# Patient Record
Sex: MALE | Race: WHITE | NOT HISPANIC OR LATINO | Employment: FULL TIME | URBAN - METROPOLITAN AREA
[De-identification: names, ages, dates, MRNs, and addresses within clinical notes are randomized per-mention and may not be internally consistent; named-entity substitution may affect disease eponyms.]

---
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
## 2021-12-07 ENCOUNTER — HOSPITAL ENCOUNTER (EMERGENCY)
Facility: CLINIC | Age: 21
Discharge: HOME | End: 2021-12-08
Attending: EMERGENCY MEDICINE | Admitting: EMERGENCY MEDICINE
Payer: COMMERCIAL

## 2021-12-07 DIAGNOSIS — J10.1 INFLUENZA A: ICD-10-CM

## 2021-12-07 LAB
FLUAV RNA SPEC QL NAA+PROBE: POSITIVE
FLUBV RNA RESP QL NAA+PROBE: NEGATIVE
SARS-COV-2 RNA RESP QL NAA+PROBE: NEGATIVE

## 2021-12-07 PROCEDURE — 87636 SARSCOV2 & INF A&B AMP PRB: CPT | Performed by: EMERGENCY MEDICINE

## 2021-12-07 PROCEDURE — C9803 HOPD COVID-19 SPEC COLLECT: HCPCS

## 2021-12-07 PROCEDURE — 99283 EMERGENCY DEPT VISIT LOW MDM: CPT

## 2021-12-07 ASSESSMENT — MIFFLIN-ST. JEOR: SCORE: 2397.45

## 2021-12-07 NOTE — Clinical Note
Sal Ye was seen and treated in our emergency department on 12/7/2021.  He may return to work on 12/14/2021.       If you have any questions or concerns, please don't hesitate to call.      Gisela Orellana MD

## 2021-12-08 VITALS
BODY MASS INDEX: 35.68 KG/M2 | SYSTOLIC BLOOD PRESSURE: 133 MMHG | WEIGHT: 287 LBS | OXYGEN SATURATION: 97 % | TEMPERATURE: 98 F | HEIGHT: 75 IN | HEART RATE: 99 BPM | DIASTOLIC BLOOD PRESSURE: 73 MMHG | RESPIRATION RATE: 16 BRPM

## 2021-12-08 PROCEDURE — 250N000013 HC RX MED GY IP 250 OP 250 PS 637: Performed by: EMERGENCY MEDICINE

## 2021-12-08 RX ORDER — IBUPROFEN 600 MG/1
600 TABLET, FILM COATED ORAL EVERY 6 HOURS PRN
Qty: 30 TABLET | Refills: 0 | Status: ON HOLD | OUTPATIENT
Start: 2021-12-08 | End: 2022-02-25

## 2021-12-08 RX ORDER — FLUTICASONE PROPIONATE 50 MCG
1 SPRAY, SUSPENSION (ML) NASAL 2 TIMES DAILY
Qty: 1 ML | Refills: 0 | Status: ON HOLD | OUTPATIENT
Start: 2021-12-08 | End: 2022-02-24

## 2021-12-08 RX ORDER — IBUPROFEN 600 MG/1
600 TABLET, FILM COATED ORAL ONCE
Status: COMPLETED | OUTPATIENT
Start: 2021-12-08 | End: 2021-12-08

## 2021-12-08 RX ADMIN — IBUPROFEN 600 MG: 600 TABLET ORAL at 00:28

## 2021-12-08 NOTE — ED PROVIDER NOTES
"  History     Chief Complaint:  Flu Symptoms      The history is provided by the patient.      Sal Ye is a 21 year old male with history of asthma who presents with 2 days of congestion, rhinorrhea, fatigue, weakness, myalgias, headache, nausea, shortness of breath. He denies vomiting and reports that he has been able to keep food and fluids down as he is not vomiting. He has been taking 600 mg of ibuprofen every 3-4 hours which has not been helping. He states that he was seen at [facility] yesterday and was swabbed for COVID-19, which was negative, and sent home. He mentions that he did not have a positive experience there. Sal reports that he has had his flu vaccine but not his COVID-19 vaccine; he notes that he had a COVID-19 infection in February 2021.    Review of Systems  10 systems reviewed and negative except as above and in HPI.     Allergies:  The patient has no known allergies.     Medications:  Albuterol  Elavil  Imitrex  Buspar  Wellbutrin  Hydroxyzine  Meloxicam    Past Medical History:    Lumbar DDD  ADD   Obesity  Migraine  Asthma   Cellulitis  GI bleed    Past Surgical History:    Ear tube placement  Toe surgery  Tonsillectomy    Family History:    Father: migraines    Social History:  Patient presents to the ED alone.  Patient presents to the ED via car.    Physical Exam     Patient Vitals for the past 24 hrs:   BP Temp Temp src Pulse Resp SpO2 Height Weight   12/08/21 0015 133/73 -- -- -- -- 97 % -- --   12/08/21 0010 133/73 -- -- 99 -- 98 % -- --   12/08/21 0000 -- -- -- -- -- 96 % -- --   12/07/21 2345 -- -- -- -- -- 98 % -- --   12/07/21 2248 (!) 129/5 98  F (36.7  C) Temporal 99 16 98 % 1.905 m (6' 3\") 130.2 kg (287 lb)     Physical Exam  General: Alert, No distress. Nontoxic appearance  Head: No signs of trauma.   Mouth/Throat: Oropharynx moist.   Eyes: Conjunctivae are normal. Pupils are equal..   Neck: Normal range of motion.    CV: Appears well perfused.  Resp:No respiratory " distress.   MSK: Normal range of motion. No obvious deformity.   Neuro: The patient is alert and interactive. KRISHNA. Speech normal. GCS 15  Skin: No lesion or sign of trauma noted.   Psych: normal mood and affect. behavior is normal.      Emergency Department Course     Laboratory:  Labs Ordered and Resulted from Time of ED Arrival to Time of ED Departure   INFLUENZA A/B & SARS-COV2 PCR MULTIPLEX - Abnormal       Result Value    Influenza A PCR Positive (*)     Influenza B PCR Negative      SARS CoV2 PCR Negative       Emergency Department Course:    Reviewed:  I reviewed nursing notes, vitals, past medical history, care everywhere, and [facility]    Assessments:  2350 I obtained history and examined the patient as noted above. I explained laboratory findings.     Interventions:  0028 Ibuprofen 600 mg PO    Disposition:  The patient was discharged to home.     Impression & Plan     Medical Decision Making:  Sal Ye is a 21 year old male who presents for evaluation of cough and fever. They also have myalgias. This is consistent with influenza. Patient understands the sensitivity of influenza rapid test.  They are at risk for pneumonia but no signs of this are detected on today's visit.  Close followup of primary care physician is indicated and return to the ED for high fevers > 103 for more than 48 hours more, increasing productive cough, shortness of breath, or confusion.  There is no signs of serious bacterial infection such as bacteremia, meningitis, UTI/pyelonephritis, strep pharyngitis, etc.      Diagnosis:    ICD-10-CM    1. Influenza A  J10.1        Discharge Medications:  New Prescriptions    FLUTICASONE (FLONASE) 50 MCG/ACT NASAL SPRAY    Spray 1 spray into both nostrils 2 times daily    IBUPROFEN (ADVIL/MOTRIN) 600 MG TABLET    Take 1 tablet (600 mg) by mouth every 6 hours as needed for moderate pain       Scribe Disclosure:  Ping GOMEZ, am serving as a scribe at 11:34 PM on 12/7/2021 to document  services personally performed by Gisela Orellana MD based on my observations and the provider's statements to me.       Gisela Orellana MD  12/08/21 2102

## 2021-12-11 ENCOUNTER — HOSPITAL ENCOUNTER (EMERGENCY)
Facility: CLINIC | Age: 21
Discharge: HOME | End: 2021-12-12
Admitting: EMERGENCY MEDICINE
Payer: COMMERCIAL

## 2021-12-11 PROCEDURE — 999N000104 HC STATISTIC NO CHARGE

## 2021-12-12 VITALS
HEART RATE: 117 BPM | OXYGEN SATURATION: 97 % | DIASTOLIC BLOOD PRESSURE: 76 MMHG | TEMPERATURE: 98.3 F | RESPIRATION RATE: 24 BRPM | SYSTOLIC BLOOD PRESSURE: 144 MMHG

## 2021-12-12 LAB
ALBUMIN SERPL-MCNC: 3.9 G/DL (ref 3.4–5)
ALP SERPL-CCNC: 60 U/L (ref 40–150)
ALT SERPL W P-5'-P-CCNC: 76 U/L (ref 0–70)
ANION GAP SERPL CALCULATED.3IONS-SCNC: 8 MMOL/L (ref 3–14)
AST SERPL W P-5'-P-CCNC: 56 U/L (ref 0–45)
BASOPHILS # BLD AUTO: 0 10E3/UL (ref 0–0.2)
BASOPHILS NFR BLD AUTO: 0 %
BILIRUB SERPL-MCNC: 0.2 MG/DL (ref 0.2–1.3)
BUN SERPL-MCNC: 10 MG/DL (ref 7–30)
CALCIUM SERPL-MCNC: 8.7 MG/DL (ref 8.5–10.1)
CHLORIDE BLD-SCNC: 110 MMOL/L (ref 94–109)
CO2 SERPL-SCNC: 23 MMOL/L (ref 20–32)
CREAT SERPL-MCNC: 0.69 MG/DL (ref 0.66–1.25)
EOSINOPHIL # BLD AUTO: 0 10E3/UL (ref 0–0.7)
EOSINOPHIL NFR BLD AUTO: 0 %
ERYTHROCYTE [DISTWIDTH] IN BLOOD BY AUTOMATED COUNT: 11.9 % (ref 10–15)
ETHANOL SERPL-MCNC: 0.13 G/DL
GFR SERPL CREATININE-BSD FRML MDRD: >90 ML/MIN/1.73M2
GLUCOSE BLD-MCNC: 114 MG/DL (ref 70–99)
HCT VFR BLD AUTO: 42.8 % (ref 40–53)
HGB BLD-MCNC: 14.8 G/DL (ref 13.3–17.7)
IMM GRANULOCYTES # BLD: 0.1 10E3/UL
IMM GRANULOCYTES NFR BLD: 1 %
LYMPHOCYTES # BLD AUTO: 1.3 10E3/UL (ref 0.8–5.3)
LYMPHOCYTES NFR BLD AUTO: 14 %
MCH RBC QN AUTO: 30.2 PG (ref 26.5–33)
MCHC RBC AUTO-ENTMCNC: 34.6 G/DL (ref 31.5–36.5)
MCV RBC AUTO: 87 FL (ref 78–100)
MONOCYTES # BLD AUTO: 0.5 10E3/UL (ref 0–1.3)
MONOCYTES NFR BLD AUTO: 5 %
NEUTROPHILS # BLD AUTO: 7.7 10E3/UL (ref 1.6–8.3)
NEUTROPHILS NFR BLD AUTO: 80 %
NRBC # BLD AUTO: 0 10E3/UL
NRBC BLD AUTO-RTO: 0 /100
PLATELET # BLD AUTO: 254 10E3/UL (ref 150–450)
POTASSIUM BLD-SCNC: 3.1 MMOL/L (ref 3.4–5.3)
PROT SERPL-MCNC: 7.4 G/DL (ref 6.8–8.8)
RBC # BLD AUTO: 4.9 10E6/UL (ref 4.4–5.9)
SODIUM SERPL-SCNC: 141 MMOL/L (ref 133–144)
WBC # BLD AUTO: 9.6 10E3/UL (ref 4–11)

## 2021-12-12 PROCEDURE — 36415 COLL VENOUS BLD VENIPUNCTURE: CPT | Performed by: EMERGENCY MEDICINE

## 2021-12-12 PROCEDURE — 80053 COMPREHEN METABOLIC PANEL: CPT | Performed by: EMERGENCY MEDICINE

## 2021-12-12 PROCEDURE — 82077 ASSAY SPEC XCP UR&BREATH IA: CPT | Performed by: EMERGENCY MEDICINE

## 2021-12-12 PROCEDURE — 250N000011 HC RX IP 250 OP 636: Performed by: EMERGENCY MEDICINE

## 2021-12-12 PROCEDURE — 85025 COMPLETE CBC W/AUTO DIFF WBC: CPT | Performed by: EMERGENCY MEDICINE

## 2021-12-12 RX ORDER — ONDANSETRON 2 MG/ML
4 INJECTION INTRAMUSCULAR; INTRAVENOUS ONCE
Status: COMPLETED | OUTPATIENT
Start: 2021-12-12 | End: 2021-12-12

## 2021-12-12 RX ADMIN — ONDANSETRON 4 MG: 2 INJECTION INTRAMUSCULAR; INTRAVENOUS at 00:15

## 2021-12-12 NOTE — ED TRIAGE NOTES
"[facility]  ED Arrival Note    Arrives through triage ambulatory to a wheelchair stating that he is too intoxicated to walk and feels like he is going to \"black out. Pt states that he has been vomiting and cant keep anything down. Recently dx with Influenza.     Visitors during triage: Partner    Triage Interventions: N/A    Ambulatory: Yes    Meets Stroke Criteria?: No    Meets Trauma Criteria?: No    Shock Index: N/A, for provider reference    Directed to: Main ED    "

## 2022-02-23 ENCOUNTER — HOSPITAL ENCOUNTER (OUTPATIENT)
Facility: CLINIC | Age: 22
Setting detail: OBSERVATION
Discharge: HOME | End: 2022-02-25
Attending: EMERGENCY MEDICINE | Admitting: INTERNAL MEDICINE
Payer: COMMERCIAL

## 2022-02-23 DIAGNOSIS — L03.113 RIGHT ARM CELLULITIS: ICD-10-CM

## 2022-02-23 LAB
ANION GAP SERPL CALCULATED.3IONS-SCNC: 4 MMOL/L (ref 3–14)
BASOPHILS # BLD AUTO: 0 10E3/UL (ref 0–0.2)
BASOPHILS NFR BLD AUTO: 1 %
BUN SERPL-MCNC: 13 MG/DL (ref 7–30)
CALCIUM SERPL-MCNC: 8.8 MG/DL (ref 8.5–10.1)
CHLORIDE BLD-SCNC: 108 MMOL/L (ref 94–109)
CO2 SERPL-SCNC: 26 MMOL/L (ref 20–32)
CREAT SERPL-MCNC: 1.04 MG/DL (ref 0.66–1.25)
CRP SERPL-MCNC: <2.9 MG/L (ref 0–8)
EOSINOPHIL # BLD AUTO: 0.1 10E3/UL (ref 0–0.7)
EOSINOPHIL NFR BLD AUTO: 1 %
ERYTHROCYTE [DISTWIDTH] IN BLOOD BY AUTOMATED COUNT: 12.2 % (ref 10–15)
GFR SERPL CREATININE-BSD FRML MDRD: >90 ML/MIN/1.73M2
GLUCOSE BLD-MCNC: 96 MG/DL (ref 70–99)
HCT VFR BLD AUTO: 43.1 % (ref 40–53)
HGB BLD-MCNC: 14.9 G/DL (ref 13.3–17.7)
IMM GRANULOCYTES # BLD: 0 10E3/UL
IMM GRANULOCYTES NFR BLD: 0 %
LACTATE SERPL-SCNC: 0.7 MMOL/L (ref 0.7–2)
LYMPHOCYTES # BLD AUTO: 1.9 10E3/UL (ref 0.8–5.3)
LYMPHOCYTES NFR BLD AUTO: 25 %
MCH RBC QN AUTO: 30.4 PG (ref 26.5–33)
MCHC RBC AUTO-ENTMCNC: 34.6 G/DL (ref 31.5–36.5)
MCV RBC AUTO: 88 FL (ref 78–100)
MONOCYTES # BLD AUTO: 0.8 10E3/UL (ref 0–1.3)
MONOCYTES NFR BLD AUTO: 11 %
NEUTROPHILS # BLD AUTO: 4.9 10E3/UL (ref 1.6–8.3)
NEUTROPHILS NFR BLD AUTO: 62 %
NRBC # BLD AUTO: 0 10E3/UL
NRBC BLD AUTO-RTO: 0 /100
PLATELET # BLD AUTO: 261 10E3/UL (ref 150–450)
POTASSIUM BLD-SCNC: 4 MMOL/L (ref 3.4–5.3)
PROCALCITONIN SERPL-MCNC: <0.05 NG/ML
RBC # BLD AUTO: 4.9 10E6/UL (ref 4.4–5.9)
SARS-COV-2 RNA RESP QL NAA+PROBE: NEGATIVE
SODIUM SERPL-SCNC: 138 MMOL/L (ref 133–144)
WBC # BLD AUTO: 7.8 10E3/UL (ref 4–11)

## 2022-02-23 PROCEDURE — 87040 BLOOD CULTURE FOR BACTERIA: CPT | Performed by: EMERGENCY MEDICINE

## 2022-02-23 PROCEDURE — C9803 HOPD COVID-19 SPEC COLLECT: HCPCS

## 2022-02-23 PROCEDURE — 250N000011 HC RX IP 250 OP 636: Performed by: EMERGENCY MEDICINE

## 2022-02-23 PROCEDURE — 86140 C-REACTIVE PROTEIN: CPT | Performed by: EMERGENCY MEDICINE

## 2022-02-23 PROCEDURE — 85025 COMPLETE CBC W/AUTO DIFF WBC: CPT | Performed by: EMERGENCY MEDICINE

## 2022-02-23 PROCEDURE — 83605 ASSAY OF LACTIC ACID: CPT | Performed by: EMERGENCY MEDICINE

## 2022-02-23 PROCEDURE — 87635 SARS-COV-2 COVID-19 AMP PRB: CPT | Performed by: EMERGENCY MEDICINE

## 2022-02-23 PROCEDURE — 84145 PROCALCITONIN (PCT): CPT | Performed by: EMERGENCY MEDICINE

## 2022-02-23 PROCEDURE — 36415 COLL VENOUS BLD VENIPUNCTURE: CPT | Performed by: EMERGENCY MEDICINE

## 2022-02-23 PROCEDURE — 99285 EMERGENCY DEPT VISIT HI MDM: CPT | Mod: 25

## 2022-02-23 PROCEDURE — 96365 THER/PROPH/DIAG IV INF INIT: CPT

## 2022-02-23 PROCEDURE — 96375 TX/PRO/DX INJ NEW DRUG ADDON: CPT

## 2022-02-23 PROCEDURE — 80048 BASIC METABOLIC PNL TOTAL CA: CPT | Performed by: EMERGENCY MEDICINE

## 2022-02-23 RX ORDER — VANCOMYCIN HYDROCHLORIDE 1 G/200ML
1000 INJECTION, SOLUTION INTRAVENOUS ONCE
Status: DISCONTINUED | OUTPATIENT
Start: 2022-02-23 | End: 2022-02-23

## 2022-02-23 RX ADMIN — CEFEPIME HYDROCHLORIDE 2 G: 2 INJECTION, POWDER, FOR SOLUTION INTRAVENOUS at 23:34

## 2022-02-23 RX ADMIN — HYDROMORPHONE HYDROCHLORIDE 1 MG: 1 INJECTION, SOLUTION INTRAMUSCULAR; INTRAVENOUS; SUBCUTANEOUS at 23:21

## 2022-02-24 LAB
HOLD SPECIMEN: NORMAL

## 2022-02-24 PROCEDURE — 96375 TX/PRO/DX INJ NEW DRUG ADDON: CPT

## 2022-02-24 PROCEDURE — 250N000011 HC RX IP 250 OP 636: Performed by: HOSPITALIST

## 2022-02-24 PROCEDURE — 258N000003 HC RX IP 258 OP 636: Performed by: EMERGENCY MEDICINE

## 2022-02-24 PROCEDURE — 99219 PR INITIAL OBSERVATION CARE,LEVEL II: CPT | Performed by: HOSPITALIST

## 2022-02-24 PROCEDURE — 96367 TX/PROPH/DG ADDL SEQ IV INF: CPT

## 2022-02-24 PROCEDURE — 96376 TX/PRO/DX INJ SAME DRUG ADON: CPT

## 2022-02-24 PROCEDURE — 250N000013 HC RX MED GY IP 250 OP 250 PS 637: Performed by: HOSPITALIST

## 2022-02-24 PROCEDURE — 250N000011 HC RX IP 250 OP 636: Performed by: EMERGENCY MEDICINE

## 2022-02-24 PROCEDURE — 258N000003 HC RX IP 258 OP 636: Performed by: HOSPITALIST

## 2022-02-24 PROCEDURE — 99207 PR CDG-CODE CATEGORY CHANGED: CPT | Performed by: HOSPITALIST

## 2022-02-24 PROCEDURE — 250N000011 HC RX IP 250 OP 636: Performed by: INTERNAL MEDICINE

## 2022-02-24 PROCEDURE — G0378 HOSPITAL OBSERVATION PER HR: HCPCS

## 2022-02-24 RX ORDER — HYDROMORPHONE HCL IN WATER/PF 6 MG/30 ML
0.2 PATIENT CONTROLLED ANALGESIA SYRINGE INTRAVENOUS
Status: DISCONTINUED | OUTPATIENT
Start: 2022-02-24 | End: 2022-02-24

## 2022-02-24 RX ORDER — LIDOCAINE 40 MG/G
CREAM TOPICAL
Status: DISCONTINUED | OUTPATIENT
Start: 2022-02-24 | End: 2022-02-25 | Stop reason: HOSPADM

## 2022-02-24 RX ORDER — OXYCODONE HYDROCHLORIDE 5 MG/1
5 TABLET ORAL EVERY 4 HOURS PRN
Status: DISCONTINUED | OUTPATIENT
Start: 2022-02-24 | End: 2022-02-25 | Stop reason: HOSPADM

## 2022-02-24 RX ORDER — NALOXONE HYDROCHLORIDE 0.4 MG/ML
0.2 INJECTION, SOLUTION INTRAMUSCULAR; INTRAVENOUS; SUBCUTANEOUS
Status: DISCONTINUED | OUTPATIENT
Start: 2022-02-24 | End: 2022-02-25 | Stop reason: HOSPADM

## 2022-02-24 RX ORDER — HYDROMORPHONE HYDROCHLORIDE 1 MG/ML
0.5 INJECTION, SOLUTION INTRAMUSCULAR; INTRAVENOUS; SUBCUTANEOUS
Status: DISCONTINUED | OUTPATIENT
Start: 2022-02-24 | End: 2022-02-25 | Stop reason: HOSPADM

## 2022-02-24 RX ORDER — CEFAZOLIN SODIUM 2 G/100ML
2 INJECTION, SOLUTION INTRAVENOUS EVERY 8 HOURS
Status: DISCONTINUED | OUTPATIENT
Start: 2022-02-24 | End: 2022-02-25 | Stop reason: HOSPADM

## 2022-02-24 RX ORDER — NALOXONE HYDROCHLORIDE 0.4 MG/ML
0.4 INJECTION, SOLUTION INTRAMUSCULAR; INTRAVENOUS; SUBCUTANEOUS
Status: DISCONTINUED | OUTPATIENT
Start: 2022-02-24 | End: 2022-02-25 | Stop reason: HOSPADM

## 2022-02-24 RX ADMIN — HYDROMORPHONE HYDROCHLORIDE 0.5 MG: 1 INJECTION, SOLUTION INTRAMUSCULAR; INTRAVENOUS; SUBCUTANEOUS at 08:04

## 2022-02-24 RX ADMIN — HYDROMORPHONE HYDROCHLORIDE 0.5 MG: 1 INJECTION, SOLUTION INTRAMUSCULAR; INTRAVENOUS; SUBCUTANEOUS at 02:29

## 2022-02-24 RX ADMIN — OXYCODONE HYDROCHLORIDE 5 MG: 5 TABLET ORAL at 09:31

## 2022-02-24 RX ADMIN — VANCOMYCIN HYDROCHLORIDE 2500 MG: 5 INJECTION, POWDER, LYOPHILIZED, FOR SOLUTION INTRAVENOUS at 00:16

## 2022-02-24 RX ADMIN — CEFAZOLIN SODIUM 2 G: 2 INJECTION, SOLUTION INTRAVENOUS at 19:58

## 2022-02-24 RX ADMIN — OXYCODONE HYDROCHLORIDE 5 MG: 5 TABLET ORAL at 22:08

## 2022-02-24 RX ADMIN — VANCOMYCIN HYDROCHLORIDE 1500 MG: 5 INJECTION, POWDER, LYOPHILIZED, FOR SOLUTION INTRAVENOUS at 12:49

## 2022-02-24 RX ADMIN — CEFEPIME HYDROCHLORIDE 2 G: 2 INJECTION, POWDER, FOR SOLUTION INTRAVENOUS at 12:06

## 2022-02-24 RX ADMIN — HYDROMORPHONE HYDROCHLORIDE 0.2 MG: 0.2 INJECTION, SOLUTION INTRAMUSCULAR; INTRAVENOUS; SUBCUTANEOUS at 00:43

## 2022-02-24 RX ADMIN — OXYCODONE HYDROCHLORIDE 5 MG: 5 TABLET ORAL at 03:31

## 2022-02-24 RX ADMIN — HYDROMORPHONE HYDROCHLORIDE 0.5 MG: 1 INJECTION, SOLUTION INTRAMUSCULAR; INTRAVENOUS; SUBCUTANEOUS at 20:30

## 2022-02-24 ASSESSMENT — ENCOUNTER SYMPTOMS
SHORTNESS OF BREATH: 0
FEVER: 0
SORE THROAT: 0

## 2022-02-24 ASSESSMENT — ACTIVITIES OF DAILY LIVING (ADL)
TOILETING: 0-->INDEPENDENT
ADLS_ACUITY_SCORE: 7
NUMBER_OF_TIMES_PATIENT_HAS_FALLEN_WITHIN_LAST_SIX_MONTHS: 1
DIFFICULTY_EATING/SWALLOWING: NO
HEARING_DIFFICULTY_OR_DEAF: NO
ADLS_ACUITY_SCORE: 7
ADLS_ACUITY_SCORE: 12
WALKING_OR_CLIMBING_STAIRS_DIFFICULTY: NO
TRANSFERRING: 0-->INDEPENDENT
ADLS_ACUITY_SCORE: 7
ADLS_ACUITY_SCORE: 7
WALKING_OR_CLIMBING_STAIRS: OTHER (SEE COMMENTS)
ADLS_ACUITY_SCORE: 7
ADLS_ACUITY_SCORE: 7
FALL_HISTORY_WITHIN_LAST_SIX_MONTHS: YES
DOING_ERRANDS_INDEPENDENTLY_DIFFICULTY: YES
ADLS_ACUITY_SCORE: 7
ADLS_ACUITY_SCORE: 7
TOILETING_ISSUES: YES
BATHING: 0-->INDEPENDENT
ADLS_ACUITY_SCORE: 7
VISION_MANAGEMENT: GLASSES
ADLS_ACUITY_SCORE: 7
DRESS: 0-->INDEPENDENT
ADLS_ACUITY_SCORE: 7
DRESSING/BATHING_DIFFICULTY: YES
TRANSFERRING: 0-->INDEPENDENT
DIFFICULTY_COMMUNICATING: NO
ADLS_ACUITY_SCORE: 7
DRESS: 0-->INDEPENDENT
TOILETING: 0-->INDEPENDENT
ADLS_ACUITY_SCORE: 12
WEAR_GLASSES_OR_BLIND: YES
CONCENTRATING,_REMEMBERING_OR_MAKING_DECISIONS_DIFFICULTY: NO
ADLS_ACUITY_SCORE: 7

## 2022-02-24 NOTE — ED NOTES
[facility]  ED Nurse Handoff Report    ED Chief complaint: Wound check     ED Diagnosis:   Final diagnoses:   Right arm cellulitis       Code Status: To be addressed by admitting provider.     Allergies:   Allergies   Allergen Reactions     No Known Drug Allergies        Patient Story: Patient arrived to ED for evaluation of erythema to right lower forearm. Patient states he has a history of cellulitis with surgical intervention. Patient states he had a red bump that looked like a pimple, which he popped with a razor blade at home. Reddened area has spread outside outlined area. Patient was treated with antibiotics at another facility but states he was unable to  his prescription due to unknown reasons. Denies IV drug use or bumping/scraping the arm. Afebrile in ED.    Focused Assessment:    Cardiac - WDL  Respiratory - WDL  Neuro - WDL  Skin - Scabbed/open area outlined on R. Forearm. Redness and erythema has spread further up arm.  Labs Ordered and Resulted from Time of ED Arrival to Time of ED Departure   BASIC METABOLIC PANEL - Normal       Result Value    Sodium 138      Potassium 4.0      Chloride 108      Carbon Dioxide (CO2) 26      Anion Gap 4      Urea Nitrogen 13      Creatinine 1.04      Calcium 8.8      Glucose 96      GFR Estimate >90     LACTIC ACID WHOLE BLOOD - Normal    Lactic Acid 0.7     CRP INFLAMMATION - Normal    CRP Inflammation <2.9     PROCALCITONIN - Normal    Procalcitonin <0.05     COVID-19 VIRUS (CORONAVIRUS) BY PCR - Normal    SARS CoV2 PCR Negative     CBC WITH PLATELETS AND DIFFERENTIAL    WBC Count 7.8      RBC Count 4.90      Hemoglobin 14.9      Hematocrit 43.1      MCV 88      MCH 30.4      MCHC 34.6      RDW 12.2      Platelet Count 261      % Neutrophils 62      % Lymphocytes 25      % Monocytes 11      % Eosinophils 1      % Basophils 1      % Immature Granulocytes 0      NRBCs per 100 WBC 0      Absolute Neutrophils 4.9      Absolute Lymphocytes 1.9       Absolute Monocytes 0.8      Absolute Eosinophils 0.1      Absolute Basophils 0.0      Absolute Immature Granulocytes 0.0      Absolute NRBCs 0.0     BLOOD CULTURE   BLOOD CULTURE       Treatments and/or interventions provided: Labs. IV antibiotics. Pain medication.     Patient's response to treatments and/or interventions: Patient tolerating ED interventions appropriately.    To be done/followed up on inpatient unit:  See orders.     Does this patient have any cognitive concerns?: None    Activity level - Baseline/Home:  Independent  Activity Level - Current:   Independent    Patient's Preferred language: English   Needed?: No    Isolation: None  Infection: Not Applicable  Patient tested for COVID 19 prior to admission: YES  Bariatric?: No    Vital Signs:   Vitals:    02/23/22 2237   BP: 104/48   Pulse: 89   Temp: 98.1  F (36.7  C)   TempSrc: Oral   SpO2: 98%       Cardiac Rhythm:     Was the PSS-3 completed:   Yes  What interventions are required if any?               Family Comments: N/A  OBS brochure/video discussed/provided to patient/family: N/A              Name of person given brochure if not patient: N/A              Relationship to patient: N/A    For the majority of the shift this patient's behavior was Green.   Behavioral interventions performed were Patient calm and cooperative in ED.    ED NURSE PHONE NUMBER: [phone]

## 2022-02-24 NOTE — PROGRESS NOTES
/64   Pulse 89   Temp 97.8  F (36.6  C) (Oral)   Resp 16   SpO2 98%     AxOx4, independent in the room, painful and numb right forearm CMS otherwise intact, pulses strong. Patient complaining of 10/10 pain, IV dilaudid for severe pain and oxy to maintain analgesia, will continue to monitor. Patient calm, cooperative, and pleasant no other concerns.

## 2022-02-24 NOTE — PROGRESS NOTES
[facility]    Received request for benefit check should Sal require home IV abx. Sal has 100% coverage for IV abx through [facility] plan. He ay have a copay per dispense on the drug through pharmacy plan.    Thank you    Maryjane Reyes RN  [facility] Liaison  [phone] (Mon thru Fri 8am - 5pm)  [phone] Office

## 2022-02-24 NOTE — PHARMACY-VANCOMYCIN DOSING SERVICE
Pharmacy Vancomycin Initial Note  Date of Service 2022  Patient's  2000  22 year old, male    Indication: Skin and Soft Tissue Infection    Current estimated CrCl = Estimated Creatinine Clearance: 163.4 mL/min (based on SCr of 1.04 mg/dL).    Creatinine for last 3 days  2022: 10:47 PM Creatinine 1.04 mg/dL    Recent Vancomycin Level(s) for last 3 days  No results found for requested labs within last 72 hours.      Vancomycin IV Administrations (past 72 hours)                   vancomycin 2500 mg in 0.9% NaCl 500 ml intermittent infusion 2,500 mg (mg) 2,500 mg New Bag 22 0016                Nephrotoxins and other renal medications (From now, onward)    Start     Dose/Rate Route Frequency Ordered Stop    22 1200  vancomycin 1500 mg in 0.9% NaCl 250 ml intermittent infusion 1,500 mg         1,500 mg  over 90 Minutes Intravenous EVERY 12 HOURS 22 0155      22 2325  vancomycin 2500 mg in 0.9% NaCl 500 ml intermittent infusion 2,500 mg         2,500 mg  over 120 Minutes Intravenous ONCE 22 2321            Contrast Orders - past 72 hours (72h ago, onward)            None          Insight[employer] Prediction of Planned Initial Vancomycin Regimen  Loading dose: 2500 mg at 00:16 2022.  Regimen: 1500 mg IV every 12 hours.  Start time: 01:54 on 2022  Exposure target: AUC24 (range)400-600 mg/L.hr   AUC24,ss: 549 mg/L.hr  Probability of AUC24 > 400: 81 %  Ctrough,ss: 17.9 mg/L  Probability of Ctrough,ss > 20: 41 %  Probability of nephrotoxicity (Lodise STEVEN ): 14 %          Plan:  1. Start vancomycin  1500 mg IV q12h.   2. Vancomycin monitoring method: AUC  3. Vancomycin therapeutic monitoring goal: 400-600 mg*h/L  4. Pharmacy will check vancomycin levels as appropriate in 1-3 Days.    5. Serum creatinine levels will be ordered daily for the first week of therapy and at least twice weekly for subsequent weeks.      Martell Hoover [facility]

## 2022-02-24 NOTE — PHARMACY-ADMISSION MEDICATION HISTORY
Pharmacy Medication History  Admission medication history interview status for the 2/23/2022  admission is complete. See EPIC admission navigator for prior to admission medications     Location of Interview: Phone  Medication history sources: Patient, Surescripts and Care Everywhere    Significant changes made to the medication list:  Removed: albuterol, amitriptyline, amoxicillin, Flonase, Liquifilm tears, sumatriptan      Additional medication history information:   Patient confirmed not taking any prescription or OTC meds other than the ibuprofen PRN.    Medication reconciliation completed by provider prior to medication history? No    Time spent in this activity: 15 mins    Prior to Admission medications    Medication Sig Last Dose Taking? Auth Provider   ibuprofen (ADVIL/MOTRIN) 600 MG tablet Take 1 tablet (600 mg) by mouth every 6 hours as needed for moderate pain  at PRN Yes Gisela Orellana MD       The information provided in this note is only as accurate as the sources available at the time of update(s)     Rachel Munoz PharmD

## 2022-02-24 NOTE — H&P
[facility]    History and Physical - Hospitalist Service       Date of Admission:  2/23/2022    Assessment & Plan      Sal Ye is a 22 year old male admitted on 2/23/2022. He is admitted to the hospital for cellulitis which is worsening.    1) cellulitis of the right forearm  -Patient has symptoms present for the past 3 to 4 days and they have been getting worse and he was seen at ED at [facility] and his WBC count was normal and he had imaging of the CT scan of the forearm on the right and right humerus was done which showed  the ct humerus and ct forearm  Skin thickening and subcutaneous edema involving the volar soft tissues of the forearm and wrist, be consistent with the clinical history of cellulitis. These are all located within the subcutaneous adipose layer, and are confined to the superficial 5 mm underneath the skin.  No evidence of fasciitis or myositis.  No soft tissue gas to specifically indicate a necrotizing infection. However, clinical correlation and/or follow-up is recommended, as early necrotizing infections cannot be entirely excluded by imaging.  Normal muscles, tendons, bones, and joints. Normal vessels  -He does have significant amount amount of pain and on my examination there is changes consistent with cellulitis with redness, tenderness and he does have sensation and has good range of motion around his wrist  -Blood cultures have been done at [facility] on 2/23/2022 and will need to be followed  -His WBC count and CRP has been normal  -Patient was started on vancomycin and cefepime in the ED and we will continue the same  -We will continue to monitor him clinically very closely and if his local changes worsen then we can consult orthopedics  -We will continue the patient with pain control    2) asthma  -On exam he does not have any wheezing and his home medications can be started when verified by the pharmacy           Diet:  Regular  DVT  Prophylaxis: Ambulate every shift  Nuñez Catheter: Not present  Central Lines: None  Cardiac Monitoring: None  Code Status:  Full code    Clinically Significant Risk Factors Present on Admission                     Disposition Plan   Expected Discharge:    Anticipated discharge location:  Awaiting care coordination huddle  Delays:           The patient's care was discussed with the Bedside Nurse, Patient and ED physician .    Santa Dorsey MD  Hospitalist Service  [facility]  Securely message with the Vocera Web Console (learn more here)  Text page via SnapNames Paging/Directory       Addendum 719 am   I saw him around 7 am again and he has good pulses and swelling is less and less numbness and he is able to make a fist.Fells better  ______________________________________________________________________    Chief Complaint     Redness of the right arm and getting worse    History is obtained from the patient, talking with ER doc and review of care everywhere     History of Present Illness   Sal Ye is a 22 year old male who presented to the ED with a chief complaint that he has noticed redness which started on his forearm and has extending upward with increased pain.    Patient does have history of cellulitis in the past and was treated with I&D and antibiotics at [facility] few years back and he currently works as a  and also is a  and he mentioned that 3 to 4 days back he noticed that on the right forearm near the wrist he noticed area of circular swelling bigger than a dime and there was some redness and some local swelling and it has increased and he tried to raegan it with a razor knife and small amount of pussy material came out and he went to the outside doctor/er  on 2/22 and was given antibiotics in the ED and was prescribed outpatient antibiotics but he has not been able to pick the same and then noticed today that he has increased pain, swelling  and redness is spreading towards the elbow and he has noticed some more pain towards his humerus.  He also mentioned that the tip of his right thumb is also more painful.  He does not recall any local trauma but given his occupation he does endorse it might have had happened.  He denies any fever, chills, nausea, vomiting but does have increased redness, pain and some tingling over the humerus but his range of motion around the shoulder joint and his movement over the fingers is intact.  Patient is also able to squeeze his wrist.  He denies any chest pain, shortness of breath, nausea, vomiting, abdominal pain.    He did go to outside ER at and he had work-up done including CT scan of the humerus and right forearm which showed no evidence of fasciitis or myositis, skin thickening and subcutaneous edema involving the volar soft tissues of the forearm and wrist, be consistent with the clinical history of cellulitis. These are all located within the subcutaneous adipose layer, and are confined to the superficial 5 mm underneath the skin.  No soft tissue gas to specifically indicate a necrotizing infection. However, clinical correlation and/or follow-up is recommended, as early necrotizing infections cannot be entirely excluded by imaging.  Normal muscles, tendons, bones, and joints. Normal vessels    His labs showed that his CK was mildly elevated at 235, basic metabolic panel was normal, WBC count was normal, CRP was normal at 0.09 and his LFTs were normal apart from ALT which was 46.  He was prescribed broad-spectrum antibiotics but he did not finish antibiotics and took his IV and eloped and came to the ED at [place].  He did have blood cultures done at outside hospital      Review of Systems    The 10 point Review of Systems is negative other than noted in the HPI     Past Medical History    I have reviewed this patient's medical history and updated it with pertinent information if needed.   No past medical  history on file.    Past Surgical History   I have reviewed this patient's surgical history and updated it with pertinent information if needed.  Past Surgical History:   Procedure Laterality Date     ORTHOPEDIC SURGERY       TONSILLECTOMY Bilateral 12/17/2015    Procedure: TONSILLECTOMY;  Surgeon: Estefania Mendoza MD;  Location:  OR       Social History   I have reviewed this patient's social history and updated it with pertinent information if needed.  Social History     Tobacco Use     Smoking status: Current Every Day Smoker     Types: Vaping Device     Smokeless tobacco: Never Used   Substance Use Topics     Alcohol use: Yes     Drug use: No       Family History     No significant family history, including no history of: DM or HTN    Noncontributory to his current HOSPITAL STAY    Prior to Admission Medications   Prior to Admission Medications   Prescriptions Last Dose Informant Patient Reported? Taking?   SUMAtriptan (IMITREX) 25 MG tablet   No No   Sig: Take 1 tablet (25 mg) by mouth at onset of headache for migraine   albuterol (PROAIR HFA, PROVENTIL HFA, VENTOLIN HFA) 108 (90 BASE) MCG/ACT inhaler   Yes No   Sig: Inhale 1-2 puffs into the lungs   amitriptyline (ELAVIL) 50 MG tablet   No No   Sig: Take 1 tablet (50 mg) by mouth At Bedtime   amoxicillin (AMOXIL) 500 MG capsule   No No   Sig: Take 1 capsule (500 mg) by mouth 3 times daily   fluticasone (FLONASE) 50 MCG/ACT nasal spray   No No   Sig: Spray 1 spray into both nostrils 2 times daily   ibuprofen (ADVIL/MOTRIN) 600 MG tablet   No No   Sig: Take 1 tablet (600 mg) by mouth every 6 hours as needed for moderate pain   polyvinyl alcohol (LIQUIFILM TEARS) 1.4 % ophthalmic solution   No No   Sig: Place 1 drop Into the left eye every 2 hours      Facility-Administered Medications: None     Allergies   Allergies   Allergen Reactions     No Known Drug Allergies        Physical Exam   Vital Signs: Temp: 98.1  F (36.7  C) Temp src: Oral BP: 114/71 Pulse: 98    Resp: 16 SpO2: 98 % O2 Device: None (Room air)    Weight: 0 lbs 0 oz        General: Patient appears comfortable and in no acute distress.  HEENT: Head is atraumatic, normocephalic.  Pupils are equal, round and reactive to light.  No scleral icterus.   Neck: Neck is supple .Thyroid is not palpable.  Lymphatic: No Local LN mayo  Respiratory: Lungs are clear to auscultation bilaterally. No wheezes, rhonchi bilaterally.  Cardiovascular: Regular rate and rhythm.  Normal S1 and S2.  No murmurs, rubs, or gallops.  Radial and dorsalis pedis and  are 2+ bilaterally.  No jugular venous distention present.  No pretibial edema noted.  Abdomen:   Normal to visual inspection. Soft, No guarding, rigidity.  Normoactive bowel sounds.  Non-tender to palpation.  No masses are appreciated.  Skin: changes consistent with cellulitis with redness , local tenderness and swelling over right arm and tip of the thumb is mildly painful  Neurologic: Higher functions are within normal limits. No obvious defects in speech, language and memory.   Musculoskeletal:   There is full range of motion in the upper and lower extremities bilaterally.  Strength is 5/5 in all the major muscle groups in the upper and lower extremities bilaterally.    Psychiatric: Affect is not blunted and mood is appropriate. Thinking and behavior are appropriate.      Data   Data reviewed today: I reviewed all medications, new labs and imaging results over the last 24 hours. I personally reviewed the ct humerus and ct forearm  image(s) showing 1.  Skin thickening and subcutaneous edema involving the volar soft tissues of the forearm and wrist, be consistent with the clinical history of cellulitis. These are all located within the subcutaneous adipose layer, and are confined to the superficial 5 mm underneath the skin. .     2 .No evidence of fasciitis or myositis.     3.  No soft tissue gas to specifically indicate a necrotizing infection. However, clinical correlation  and/or follow-up is recommended, as early necrotizing infections cannot be entirely excluded by imaging.     4.  Normal muscles, tendons, bones, and joints. Normal vessels.      Recent Labs   Lab 02/23/22 2247   WBC 7.8   HGB 14.9   MCV 88         POTASSIUM 4.0   CHLORIDE 108   CO2 26   BUN 13   CR 1.04   ANIONGAP 4   STEPAN 8.8   GLC 96     Most Recent 3 CBC's:Recent Labs   Lab Test 02/23/22 2247 12/12/21  0012 11/04/20  0030   WBC 7.8 9.6 8.3   HGB 14.9 14.8 15.7   MCV 88 87 87    254 288     Most Recent 3 BMP's:Recent Labs   Lab Test 02/23/22 2247 12/12/21  0012 11/04/20  0030    141 138   POTASSIUM 4.0 3.1* 3.9   CHLORIDE 108 110* 106   CO2 26 23 28   BUN 13 10 14   CR 1.04 0.69 1.02*   ANIONGAP 4 8 4   STEPAN 8.8 8.7 8.5   GLC 96 114* 93     No results found for this or any previous visit (from the past 24 hour(s)).

## 2022-02-24 NOTE — ED PROVIDER NOTES
History     Chief Complaint:  Wound Check (Erythema to right upper arm over the last day or so. Unsure if fever. )       SONAL Ye is a 22 year old male with a history of cellulitis who presents to this emergency room for cellulitis.  The patient noticed a small pimple to the volar aspect of his right wrist approximately 5 days ago.  At that point he took a razor blade to try and raegan this with very minimal output.  He was seen at the emergency department last night for cellulitis and was placed on antibiotics.  He had not been able to feel that prescription today.  He then returned to that seen emergency department this evening where they did blood work that showed a normal CBC, an elevated CK, and ALT of 48, a normal lactic acid and metabolic panel.  Blood cultures are pending.  They also did a CT of his forearm which showed subcutaneous findings consistent with cellulitis.  They did outline his arm and take pictures both last night and tonight.  They wrote for antibiotics and this was being administered when he pulled his IV out and left AMA.  The patient states that he felt disrespected and they were being rude to him there which is why he left there and came to this hospital.  Additionally, the patient states that his been more painful over the past 5 days.  He is also felt that there has been streaking up his arm.  He is not sure whether he has had fever, denies any sore throat, cough, vomiting, diarrhea.  He states that 2 years ago he had cellulitis of his right arm.  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    ROS:  Review of Systems   Constitutional: Negative for fever.   HENT: Negative for sore throat.    Respiratory: Negative for shortness of breath.    Cardiovascular: Negative for chest pain.   All other systems reviewed and are negative.        Allergies:  No Known Drug Allergies     Medications:    albuterol (PROAIR HFA, PROVENTIL HFA,  VENTOLIN HFA) 108 (90 BASE) MCG/ACT inhaler  amitriptyline (ELAVIL) 50 MG tablet  amoxicillin (AMOXIL) 500 MG capsule  fluticasone (FLONASE) 50 MCG/ACT nasal spray  ibuprofen (ADVIL/MOTRIN) 600 MG tablet  polyvinyl alcohol (LIQUIFILM TEARS) 1.4 % ophthalmic solution  SUMAtriptan (IMITREX) 25 MG tablet        Past Medical History:    No past medical history on file.  Patient Active Problem List   Diagnosis     Right arm cellulitis        Past Surgical History:    Past Surgical History:   Procedure Laterality Date     ORTHOPEDIC SURGERY       TONSILLECTOMY Bilateral 12/17/2015    Procedure: TONSILLECTOMY;  Surgeon: Estefania Mendoza MD;  Location: [facility] OR        Family History:    family history is not on file.    Social History:   reports that he has been smoking vaping device. He has never used smokeless tobacco. He reports current alcohol use. He reports that he does not use drugs.  PCP: Clinic, [facility]     Physical Exam     Patient Vitals for the past 24 hrs:   BP Temp Temp src Pulse SpO2   02/23/22 2237 104/48 98.1  F (36.7  C) Oral 89 98 %        Physical Exam    Physical Exam   Constitutional:  Patient is oriented to person, place, and time. They appear well-developed and well-nourished. Mild distress secondary to arm pain   HENT:   Mouth/Throat:   Oropharynx is clear and moist.   Eyes:    Conjunctivae normal and EOM are normal. Pupils are equal, round, and reactive to light.   Neck:    Normal range of motion.   Cardiovascular: Normal rate, regular rhythm and normal heart sounds.  Exam reveals no gallop and no friction rub.  No murmur heard.  Pulmonary/Chest:  Effort normal and breath sounds normal. Patient has no wheezes. Patient has no rales.   Abdominal:   Soft. Bowel sounds are normal. Patient exhibits no mass. There is no tenderness. There is no rebound and no guarding.   Musculoskeletal:  Normal range of motion. Patient exhibits no edema.   Neurological:   Patient is alert and oriented to  person, place, and time. Patient has normal strength. No cranial nerve deficit or sensory deficit. GCS 15  Skin:   Patient has a scab on his right wrist with surrounding erythema.  There is no obvious fluctuance.  It is tender to touch.  He also has what appears to be rubbing petechiae going up his arm.  He also has some mild streaking.   Psychiatric:   Patient has a normal mood and affect. Patient's behavior is normal. Judgment and thought content normal.         Emergency Department Course       Laboratory:  Labs Ordered and Resulted from Time of ED Arrival to Time of ED Departure   BASIC METABOLIC PANEL - Normal       Result Value    Sodium 138      Potassium 4.0      Chloride 108      Carbon Dioxide (CO2) 26      Anion Gap 4      Urea Nitrogen 13      Creatinine 1.04      Calcium 8.8      Glucose 96      GFR Estimate >90     LACTIC ACID WHOLE BLOOD - Normal    Lactic Acid 0.7     CRP INFLAMMATION - Normal    CRP Inflammation <2.9     PROCALCITONIN - Normal    Procalcitonin <0.05     COVID-19 VIRUS (CORONAVIRUS) BY PCR - Normal    SARS CoV2 PCR Negative     CBC WITH PLATELETS AND DIFFERENTIAL    WBC Count 7.8      RBC Count 4.90      Hemoglobin 14.9      Hematocrit 43.1      MCV 88      MCH 30.4      MCHC 34.6      RDW 12.2      Platelet Count 261      % Neutrophils 62      % Lymphocytes 25      % Monocytes 11      % Eosinophils 1      % Basophils 1      % Immature Granulocytes 0      NRBCs per 100 WBC 0      Absolute Neutrophils 4.9      Absolute Lymphocytes 1.9      Absolute Monocytes 0.8      Absolute Eosinophils 0.1      Absolute Basophils 0.0      Absolute Immature Granulocytes 0.0      Absolute NRBCs 0.0     BLOOD CULTURE   BLOOD CULTURE        Procedures   None    Emergency Department Course:             Reviewed:  I reviewed nursing notes, vitals and past medical history    Assessments:   I obtained history and examined the patient as noted above.    I rechecked the patient and explained findings.        Consults:   Dr. Dorsey of hospitalist service    Interventions:  Medications   vancomycin 2500 mg in 0.9% NaCl 500 ml intermittent infusion 2,500 mg (has no administration in time range)   ceFEPIme (MAXIPIME) 2 g vial to attach to  ml bag for ADULTS or 50 ml bag for PEDS (0 g Intravenous Stopped 2/24/22 0001)   HYDROmorphone (DILAUDID) injection 1 mg (1 mg Intravenous Given 2/23/22 1131)        Disposition:  The patient was admitted to the hospital under the care of Dr. Dorsey.     Impression & Plan    CMS Diagnoses: None      Covid-19  Sal Ye was evaluated during a global COVID-19 pandemic, which necessitated consideration that the patient might be at risk for infection with the SARS-CoV-2 virus that causes COVID-19.   Applicable protocols for evaluation were followed during the patient's care.   COVID-19 was considered as part of the patient's evaluation. The plan for testing is:  a test was obtained during this visit.    Medical Decision Making:  Sal Rodriguez is a 22-year-old male presenting to this emergency department after leaving another emergency department while being treated for cellulitis.  Patient is examination is consistent with cellulitis with some lymphangitic streaking.  I do not appreciate any abscess.  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  I did review the blood work performed proximally 3 hours ago at an outside hospital as well as the imaging and pictures.  We did recheck some of his blood work here and fortunately his white count Remains normal as is his CRP and procalcitonin.  I did give him broad-spectrum antibiotics here and at this point we will admit him to hospital for cellulitis.  I did not appreciate any increased streaking or redness from his earlier visit this evening.  Certainly considered a fast spreading infection such as necrotizing fasciitis but thus far his exam is not worrisome for this.  Additionally I do  not see any evidence of abscess that needs to be drained.  We will bring him in for IV antibiotics admit to Dr. Dorsey..    Diagnosis:    ICD-10-CM    1. Right arm cellulitis  L03.113         2/24/2022   Stefani Salinas MD Bochert, Michelle Ann, MD  02/24/22 0017

## 2022-02-24 NOTE — PROGRESS NOTES
RECEIVING UNIT ED HANDOFF REVIEW    ED Nurse Handoff Report was reviewed by: Arthur Hoskins RN on February 24, 2022 at 1:07 AM

## 2022-02-24 NOTE — PROGRESS NOTES
"MD Notification    Notified Person: MD     Notified Person Name: Santa Dorsey    Notification Date/Time: 2/24/22    Notification Interaction:     Paged. \"Pain not managed with 0.2 dilaudid, patient requesting additional pain meds, reporting 10/10 pain. Order stronger dose of dilaudid or oxycodone?\"    Purpose of Notification: pain prn's    Orders Received: 8445    Comments:      "

## 2022-02-24 NOTE — UTILIZATION REVIEW
"Admission Status; Secondary Review Determination     Under the authority of the Utilization Management Committee, the utilization review process indicated a secondary review on the above patient.  The review outcome is based on review of the medical records, discussions with staff, and applying clinical experience noted on the date of the review.       (x) Observation Status Appropriate - This patient does not meet hospital inpatient criteria and is placed in observation status. If this patient's primary payer is Medicare and was admitted as an inpatient, Condition Code 44 should be used and patient status changed to \"observation\".     RATIONALE FOR DETERMINATION: 22-year-old male who developed a wound on the volar aspect of this right wrist with resultant clinical signs of cellulitis extending up his forearm.  Patient has been afebrile, normal white blood count, normal procalcitonin and CRP level.  No signs of abscess.  Observation care appropriate for initial course of IV antibiotics as well as pain control.  If patient has progression of cellulitis on 2/25/2022, then at that time patient would be appropriate to advance to inpatient care.       The severity of illness, intensity of service provided, expected LOS and risk for adverse outcome make the care appropriate for further observation; however, doesn't meet criteria for hospital inpatient admission. This was discussed with attending physician who concurred with this determination.    The information on this document is developed by the utilization review team in order for the business office to ensure compliance.  This only denotes the appropriateness of proper admission status and does not reflect the quality of care rendered.         The definitions of Inpatient Status and Observation Status used in making the determination above are those provided in the CMS Coverage Manual, Chapter 1 and Chapter 6, section 70.4.      Sincerely,     Andry Quintero MD  "   Physician Advisor  Utilization Review/ Case Management  [facility].

## 2022-02-24 NOTE — PROGRESS NOTES
MD paged. Pt is requesting to be discharge today stated '' I have to go back to work and I am no longer in pain''  Rates pain 0/10.  Please advice

## 2022-02-25 VITALS
DIASTOLIC BLOOD PRESSURE: 57 MMHG | RESPIRATION RATE: 16 BRPM | HEART RATE: 76 BPM | SYSTOLIC BLOOD PRESSURE: 116 MMHG | OXYGEN SATURATION: 97 % | TEMPERATURE: 98.4 F

## 2022-02-25 LAB
ANION GAP SERPL CALCULATED.3IONS-SCNC: 2 MMOL/L (ref 3–14)
BASOPHILS # BLD AUTO: 0 10E3/UL (ref 0–0.2)
BASOPHILS NFR BLD AUTO: 1 %
BUN SERPL-MCNC: 9 MG/DL (ref 7–30)
CALCIUM SERPL-MCNC: 8.3 MG/DL (ref 8.5–10.1)
CHLORIDE BLD-SCNC: 111 MMOL/L (ref 94–109)
CO2 SERPL-SCNC: 25 MMOL/L (ref 20–32)
CREAT SERPL-MCNC: 0.92 MG/DL (ref 0.66–1.25)
EOSINOPHIL # BLD AUTO: 0.1 10E3/UL (ref 0–0.7)
EOSINOPHIL NFR BLD AUTO: 2 %
ERYTHROCYTE [DISTWIDTH] IN BLOOD BY AUTOMATED COUNT: 12.3 % (ref 10–15)
GFR SERPL CREATININE-BSD FRML MDRD: >90 ML/MIN/1.73M2
GLUCOSE BLD-MCNC: 154 MG/DL (ref 70–99)
HCT VFR BLD AUTO: 42.1 % (ref 40–53)
HGB BLD-MCNC: 14 G/DL (ref 13.3–17.7)
IMM GRANULOCYTES # BLD: 0 10E3/UL
IMM GRANULOCYTES NFR BLD: 0 %
LYMPHOCYTES # BLD AUTO: 1.6 10E3/UL (ref 0.8–5.3)
LYMPHOCYTES NFR BLD AUTO: 37 %
MCH RBC QN AUTO: 29.9 PG (ref 26.5–33)
MCHC RBC AUTO-ENTMCNC: 33.3 G/DL (ref 31.5–36.5)
MCV RBC AUTO: 90 FL (ref 78–100)
MONOCYTES # BLD AUTO: 0.5 10E3/UL (ref 0–1.3)
MONOCYTES NFR BLD AUTO: 11 %
NEUTROPHILS # BLD AUTO: 2.2 10E3/UL (ref 1.6–8.3)
NEUTROPHILS NFR BLD AUTO: 49 %
NRBC # BLD AUTO: 0 10E3/UL
NRBC BLD AUTO-RTO: 0 /100
PLATELET # BLD AUTO: 230 10E3/UL (ref 150–450)
POTASSIUM BLD-SCNC: 3.8 MMOL/L (ref 3.4–5.3)
RBC # BLD AUTO: 4.69 10E6/UL (ref 4.4–5.9)
SODIUM SERPL-SCNC: 138 MMOL/L (ref 133–144)
WBC # BLD AUTO: 4.4 10E3/UL (ref 4–11)

## 2022-02-25 PROCEDURE — 250N000013 HC RX MED GY IP 250 OP 250 PS 637: Performed by: HOSPITALIST

## 2022-02-25 PROCEDURE — G0378 HOSPITAL OBSERVATION PER HR: HCPCS

## 2022-02-25 PROCEDURE — 80048 BASIC METABOLIC PNL TOTAL CA: CPT | Performed by: HOSPITALIST

## 2022-02-25 PROCEDURE — 250N000011 HC RX IP 250 OP 636: Performed by: INTERNAL MEDICINE

## 2022-02-25 PROCEDURE — 99217 PR OBSERVATION CARE DISCHARGE: CPT | Performed by: INTERNAL MEDICINE

## 2022-02-25 PROCEDURE — 36415 COLL VENOUS BLD VENIPUNCTURE: CPT | Performed by: HOSPITALIST

## 2022-02-25 PROCEDURE — 96376 TX/PRO/DX INJ SAME DRUG ADON: CPT

## 2022-02-25 PROCEDURE — 85025 COMPLETE CBC W/AUTO DIFF WBC: CPT | Performed by: HOSPITALIST

## 2022-02-25 RX ORDER — CEPHALEXIN 500 MG/1
1000 CAPSULE ORAL 4 TIMES DAILY
Qty: 56 CAPSULE | Refills: 0 | Status: SHIPPED | OUTPATIENT
Start: 2022-02-25 | End: 2022-03-04

## 2022-02-25 RX ADMIN — CEFAZOLIN SODIUM 2 G: 2 INJECTION, SOLUTION INTRAVENOUS at 04:07

## 2022-02-25 RX ADMIN — OXYCODONE HYDROCHLORIDE 5 MG: 5 TABLET ORAL at 04:11

## 2022-02-25 NOTE — PROGRESS NOTES
"Patient adequate for discharge. Anxious to leave and requesting doctor's note for work prior to discharge. Paged Dr. Rivera at this time, but no response back.     ADDENDUM: Patient \"in a rush\" so left floor without note   "

## 2022-02-25 NOTE — DISCHARGE SUMMARY
[facility]  Discharge Summary        Sal Ye MRN# [id number]   YOB: 2000 Age: 22 year old     Date of Admission:  2/23/2022  Date of Discharge:  2/25/2022  Admitting Physician:  Arun Rivera MD  Discharge Physician: Arun Rivera MD  Discharging Service: Hospitalist     Primary Provider:  Clinic, [facility]  Primary Care Physician Phone Number: [phone]         Discharge Diagnoses/Problem Oriented Hospital Course (Providers):    Sal Ye was admitted on 2/23/2022 by Arun Rivera MD and I would refer you to their history and physical.  The following problems were addressed during his hospitalization:    Sal Ye is a 22 year old male admitted on 2/23/2022. He is admitted to the hospital for cellulitis which is worsening.     1) cellulitis of the right forearm with lymphangitis  -Patient has symptoms present for the past 3 to 4 days and they have been getting worse and he was seen at ED at [facility] and his WBC count was normal and he had imaging of the CT scan of the forearm on the right and right humerus was done which showed  the ct humerus and ct forearm  Skin thickening and subcutaneous edema involving the volar soft tissues of the forearm and wrist, be consistent with the clinical history of cellulitis. These are all located within the subcutaneous adipose layer, and are confined to the superficial 5 mm underneath the skin.  No evidence of fasciitis or myositis.  No soft tissue gas to specifically indicate a necrotizing infection. However, clinical correlation and/or follow-up is recommended, as early necrotizing infections cannot be entirely excluded by imaging.  Normal muscles, tendons, bones, and joints. Normal vessels  -He does have significant amount amount of pain but has improved with antibiotics.  -Exam consistent with cellulitis with redness, tenderness and he does have sensation and has good range of motion  around his wrist  -Blood cultures have been done at [facility] on 2/23/2022    -His WBC count and CRP has been normal  -Patient was started on vancomycin and cefepime in the ED   -changed to ancef with ongoing improvement    -discharge with keflex 1g qid for 7 days with work restriction     2) asthma  -On exam he does not have any wheezing and his home medications can be started when verified by the pharmacy     Diet:  Regular              Code Status:      Full Code         Important Results:      Right arm with decrease swelling, redness and improved ROM  NAD  PULM CTA  CV RRR         Pending Results:        Unresulted Labs Ordered in the Past 30 Days of this Admission     Date and Time Order Name Status Description    2/23/2022 11:14 PM Blood Culture Arm, Left Preliminary     2/23/2022 11:14 PM Blood Culture Peripheral Blood Preliminary                Discharge Instructions and Follow-Up:      Follow-up Appointments     Follow-up and recommended labs and tests       Follow up with primary care provider, [facility],   within 7 -10 days for follow up                  Discharge Disposition:      Discharged to home         Discharge Medications:        Current Discharge Medication List      START taking these medications    Details   cephALEXin (KEFLEX) 500 MG capsule Take 2 capsules (1,000 mg) by mouth 4 times daily for 7 days  Qty: 56 capsule, Refills: 0    Associated Diagnoses: Right arm cellulitis         STOP taking these medications       ibuprofen (ADVIL/MOTRIN) 600 MG tablet Comments:   Reason for Stopping:                    Allergies:         Allergies   Allergen Reactions     No Known Drug Allergies             Consultations This Hospital Stay:      No consultations were requested during this admission          Discharge Orders       After Care Instructions     Activity      Your activity upon discharge: activity as tolerated         Diet      Follow this diet upon discharge:  Orders Placed This Encounter      Combination Diet Regular Diet Adult                      Discharge Time:      less than 30 minutes.        Image Results From This Hospital Stay (For Non-EPIC Providers):        Results for orders placed or performed during the hospital encounter of 11/04/20   XR Abdomen 2 Views    Narrative    EXAM: XR ABDOMEN 2 VW  LOCATION: [facility]  DATE/TIME: 11/4/2020 12:38 AM    INDICATION: Swallowed foreign body.  COMPARISON: None.      Impression    IMPRESSION: Radiopaque foreign body projects over the right lower quadrant and would be compatible with ingested foreign body. Based on its location, this is suspected to be in the distal small bowel versus potentially in the cecum. No evidence for bowel   obstruction and no free air. Moderate amounts of stool in the colon. Remainder unremarkable.    XR Chest 2 Views    Narrative    EXAM: XR CHEST 2 VW  LOCATION: [facility]  DATE/TIME: 11/4/2020 12:48 AM    INDICATION: Chest pain  COMPARISON: 08/30/2020      Impression    IMPRESSION: Negative chest.           Most Recent Lab Results In EPIC (For Non-EPIC Providers):    Most Recent 3 CBC's:  Recent Labs   Lab Test 02/25/22  0655 02/23/22 2247 12/12/21  0012   WBC 4.4 7.8 9.6   HGB 14.0 14.9 14.8   MCV 90 88 87    261 254      Most Recent 3 BMP's:  Recent Labs   Lab Test 02/25/22  0655 02/23/22 2247 12/12/21  0012    138 141   POTASSIUM 3.8 4.0 3.1*   CHLORIDE 111* 108 110*   CO2 25 26 23   BUN 9 13 10   CR 0.92 1.04 0.69   ANIONGAP 2* 4 8   STEPAN 8.3* 8.8 8.7   * 96 114*     Most Recent 3 Troponin's:  Recent Labs   Lab Test 11/04/20  0030   TROPI <0.015     Most Recent 3 INR's:No lab results found.  Most Recent 2 LFT's:  Recent Labs   Lab Test 12/12/21  0012   AST 56*   ALT 76*   ALKPHOS 60   BILITOTAL 0.2     Most Recent Cholesterol Panel:No lab results found.  Most Recent 6 Bacteria Isolates From Any Culture (See EPIC Reports for Culture  Details):No lab results found.  Most Recent TSH, T4 and HgbA1c:No lab results found.

## 2022-02-25 NOTE — PLAN OF CARE
Patient A&Ox4, VSS on RA. Independent in Room. IV SL . Intermittent Abx . Pain managed with 1x iv dilaudid & Oxycodone.  Progressing per plan of care.

## 2022-02-25 NOTE — PLAN OF CARE
Goal Outcome Evaluation:     Date/Time 0700-1930    Trauma/Ortho/Medical (Choose one) ortho    Diagnosis: Right arm cellulitis    Mental Status: A&O x4  Activity/dangle: Independent  Diet: Regular  Pain: Dilaudid and oxycodone  Nuñez/Voiding: Independent  Tele/Restraints/Iso: n/a  02/LDA: RA/ BREA  D/C Date:Unknown  Other Info: Redness  and petechia on right fore arm, minimal tender to touch. On intermittent antibiotics. Pt is requesting to be discharge today.

## 2022-02-26 ENCOUNTER — HOSPITAL ENCOUNTER (EMERGENCY)
Facility: CLINIC | Age: 22
Discharge: HOME | End: 2022-02-26
Attending: EMERGENCY MEDICINE | Admitting: EMERGENCY MEDICINE
Payer: COMMERCIAL

## 2022-02-26 VITALS
WEIGHT: 289 LBS | OXYGEN SATURATION: 97 % | SYSTOLIC BLOOD PRESSURE: 134 MMHG | HEIGHT: 75 IN | RESPIRATION RATE: 16 BRPM | DIASTOLIC BLOOD PRESSURE: 61 MMHG | HEART RATE: 98 BPM | TEMPERATURE: 98.2 F | BODY MASS INDEX: 35.93 KG/M2

## 2022-02-26 DIAGNOSIS — Z51.89 VISIT FOR WOUND CHECK: ICD-10-CM

## 2022-02-26 PROCEDURE — 99282 EMERGENCY DEPT VISIT SF MDM: CPT

## 2022-02-26 ASSESSMENT — ENCOUNTER SYMPTOMS
COLOR CHANGE: 1
FEVER: 0

## 2022-02-26 NOTE — ED PROVIDER NOTES
"  History     Chief Complaint:  Wound Check    The history is provided by the patient.      Sal Ye is a 22 year old male with history of cellulitis who presents with a wound check. The patient was recently admitted to the hospital 2/24/22 with cellulitis of the right arm, and discharged 2/25/22 with a seven day course of Keflex. He comes to the ED concerned of perceived redness at the site of his IV in his left arm from when he was in the hospital. Denies any fevers.     Review of Systems   Constitutional: Negative for fever.   Skin: Positive for color change.   All other systems reviewed and are negative.    Allergies:  The patient has no known allergies.    Medications:    Keflex     Past Medical History:    Cellulitis, right arm, possible necrotizing fasciitis  DDD, lumbar  GI bleed  Asthma  ADD  Migraine      Past Surgical History:    Orthopedic surgery  Tonsillectomy     Social History:  The patient presents to the ED alone.     Physical Exam     Patient Vitals for the past 24 hrs:   BP Temp Temp src Pulse Resp SpO2 Height Weight   02/26/22 0715 134/61 98.2  F (36.8  C) Oral 98 16 97 % 1.905 m (6' 3\") 131.1 kg (289 lb)       Physical Exam  Constitutional: White male. Supine. No respiratory distress.  Musculoskeletal: On the left arm, there is a pinpoint needle injection site in the antecubital fossa with minimal erythema and ecchymosis. No thrombosis of the vein. No ascending lymphangitic streak or axillary adenopathy. Strong radial pulse. Normal CMS distally.   Neurological: Awake, alert, and appropriate.     Emergency Department Course     Emergency Department Course:       Reviewed:  I reviewed nursing notes, vitals, past history and care everywhere    Assessments:  0722 I obtained history and examined the patient as noted above. Prepared for discharge.        Disposition:  The patient was discharged to home.    Impression & Plan         Medical Decision Making:  Sal Ye is a 22 year old male " who was recently admitted to the hospital for cellulitis and ascending lymphangitis of the arm. He was initially treated with IV antibiotics, changed to Keflex, and was discharged two days ago. He comes in now because he thinks there is some redness at the site of the IV in his left antecubital fossa. When I examine this, there is minimal redness. There is some very slight ecchymosis. There was no thrombosis of the fossa. There was no ascending lymphangitic streak or axillary adenopathy. I do not think this consists of a resistant infection, but rather some mild bruising from the previous needle site injection. Patient should use warm compresses, ibuprofen, and continue with his antibiotics, and follow up with his primary care provider. If symptoms change or worsen, re-check in the Emergency Department. He was afebrile, and not tachycardic here.     Diagnosis:    ICD-10-CM    1. Visit for wound check  Z51.89      Scribe Disclosure:  I, iJ Mcguire, am serving as a scribe at 7:17 AM on 2/26/2022 to document services personally performed by Eulalio Preston MD based on my observations and the provider's statements to me.      Eulalio Presotn MD  02/26/22 0934

## 2022-03-01 LAB
BACTERIA BLD CULT: NO GROWTH
BACTERIA BLD CULT: NO GROWTH

## 2022-05-21 ENCOUNTER — HOSPITAL ENCOUNTER (EMERGENCY)
Facility: CLINIC | Age: 22
Discharge: LEFT BEFORE DISCHARGE | End: 2022-05-21
Attending: EMERGENCY MEDICINE | Admitting: EMERGENCY MEDICINE
Payer: COMMERCIAL

## 2022-05-21 ENCOUNTER — APPOINTMENT (OUTPATIENT)
Dept: GENERAL RADIOLOGY | Facility: CLINIC | Age: 22
End: 2022-05-21
Attending: EMERGENCY MEDICINE
Payer: COMMERCIAL

## 2022-05-21 VITALS
HEIGHT: 75 IN | SYSTOLIC BLOOD PRESSURE: 125 MMHG | WEIGHT: 267 LBS | TEMPERATURE: 98.3 F | RESPIRATION RATE: 18 BRPM | BODY MASS INDEX: 33.2 KG/M2 | DIASTOLIC BLOOD PRESSURE: 73 MMHG | HEART RATE: 82 BPM | OXYGEN SATURATION: 100 %

## 2022-05-21 DIAGNOSIS — R20.2 RIGHT HAND PARESTHESIA: ICD-10-CM

## 2022-05-21 DIAGNOSIS — M79.644 PAIN OF FINGER OF RIGHT HAND: ICD-10-CM

## 2022-05-21 DIAGNOSIS — S60.221A CONTUSION OF RIGHT HAND, INITIAL ENCOUNTER: ICD-10-CM

## 2022-05-21 PROCEDURE — 99282 EMERGENCY DEPT VISIT SF MDM: CPT

## 2022-05-21 PROCEDURE — 73130 X-RAY EXAM OF HAND: CPT | Mod: RT
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
## 2022-05-21 NOTE — ED TRIAGE NOTES
Pt has swelling and bruising of R hand, injury happened 24 hours ago.      Triage Assessment     Row Name 05/21/22 0330       Triage Assessment (Adult)    Airway WDL WDL       Respiratory WDL    Respiratory WDL WDL       Skin Circulation/Temperature WDL    Skin Circulation/Temperature WDL X  swelling nad bruising of R hand       Cardiac WDL    Cardiac WDL WDL       Peripheral/Neurovascular WDL    Peripheral Neurovascular WDL WDL       Cognitive/Neuro/Behavioral WDL    Cognitive/Neuro/Behavioral WDL WDL
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
## 2022-11-15 ENCOUNTER — HOSPITAL ENCOUNTER (EMERGENCY)
Facility: CLINIC | Age: 22
Discharge: HOME | End: 2022-11-15
Payer: COMMERCIAL

## 2022-12-10 ENCOUNTER — APPOINTMENT (OUTPATIENT)
Dept: CT IMAGING | Facility: CLINIC | Age: 22
End: 2022-12-10
Attending: EMERGENCY MEDICINE
Payer: COMMERCIAL

## 2022-12-10 ENCOUNTER — HOSPITAL ENCOUNTER (EMERGENCY)
Facility: CLINIC | Age: 22
Discharge: HOME | End: 2022-12-10
Attending: EMERGENCY MEDICINE | Admitting: EMERGENCY MEDICINE
Payer: COMMERCIAL

## 2022-12-10 VITALS
OXYGEN SATURATION: 100 % | SYSTOLIC BLOOD PRESSURE: 119 MMHG | RESPIRATION RATE: 12 BRPM | DIASTOLIC BLOOD PRESSURE: 75 MMHG | TEMPERATURE: 97.2 F | HEART RATE: 108 BPM

## 2022-12-10 DIAGNOSIS — S02.2XXA CLOSED FRACTURE OF NASAL BONE, INITIAL ENCOUNTER: ICD-10-CM

## 2022-12-10 DIAGNOSIS — F10.920 ALCOHOLIC INTOXICATION WITHOUT COMPLICATION (H): ICD-10-CM

## 2022-12-10 DIAGNOSIS — R45.851 SUICIDAL IDEATION: ICD-10-CM

## 2022-12-10 PROCEDURE — 96374 THER/PROPH/DIAG INJ IV PUSH: CPT

## 2022-12-10 PROCEDURE — 70486 CT MAXILLOFACIAL W/O DYE: CPT

## 2022-12-10 PROCEDURE — 250N000011 HC RX IP 250 OP 636: Performed by: EMERGENCY MEDICINE

## 2022-12-10 PROCEDURE — 70450 CT HEAD/BRAIN W/O DYE: CPT

## 2022-12-10 PROCEDURE — 99285 EMERGENCY DEPT VISIT HI MDM: CPT | Mod: 25

## 2022-12-10 PROCEDURE — 90791 PSYCH DIAGNOSTIC EVALUATION: CPT

## 2022-12-10 PROCEDURE — 250N000013 HC RX MED GY IP 250 OP 250 PS 637: Performed by: EMERGENCY MEDICINE

## 2022-12-10 RX ORDER — ONDANSETRON 4 MG/1
4 TABLET, ORALLY DISINTEGRATING ORAL ONCE
Status: DISCONTINUED | OUTPATIENT
Start: 2022-12-10 | End: 2022-12-10 | Stop reason: ALTCHOICE

## 2022-12-10 RX ORDER — ONDANSETRON 2 MG/ML
4 INJECTION INTRAMUSCULAR; INTRAVENOUS ONCE
Status: COMPLETED | OUTPATIENT
Start: 2022-12-10 | End: 2022-12-10

## 2022-12-10 RX ORDER — CALCIUM CARBONATE 750 MG/1
750 TABLET, CHEWABLE ORAL DAILY PRN
Status: DISCONTINUED | OUTPATIENT
Start: 2022-12-10 | End: 2022-12-10 | Stop reason: RX

## 2022-12-10 RX ORDER — CALCIUM CARBONATE 500 MG/1
500 TABLET, CHEWABLE ORAL DAILY PRN
Status: DISCONTINUED | OUTPATIENT
Start: 2022-12-10 | End: 2022-12-10 | Stop reason: RX

## 2022-12-10 RX ORDER — CALCIUM CARBONATE 500 MG/1
500 TABLET, CHEWABLE ORAL DAILY PRN
Status: DISCONTINUED | OUTPATIENT
Start: 2022-12-10 | End: 2022-12-10 | Stop reason: HOSPADM

## 2022-12-10 RX ADMIN — CALCIUM CARBONATE (ANTACID) CHEW TAB 500 MG 500 MG: 500 CHEW TAB at 05:52

## 2022-12-10 RX ADMIN — ONDANSETRON 4 MG: 2 INJECTION INTRAMUSCULAR; INTRAVENOUS at 02:15

## 2022-12-10 ASSESSMENT — ENCOUNTER SYMPTOMS
CONFUSION: 1
NECK PAIN: 1
HEADACHES: 1

## 2022-12-10 ASSESSMENT — ACTIVITIES OF DAILY LIVING (ADL)
ADLS_ACUITY_SCORE: 35

## 2022-12-10 NOTE — ED NOTES
Patient awake and alert sitting up on stretcher speaking with significant other at bedside. Plan is for patient to see DEC when he is more sober due to making suicidal statements.
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
## 2022-12-10 NOTE — ED NOTES
Patient awake/alert and appropriate at this time. Patient apologetic for his behaviors and statements earlier. Patient states that he is not suicidal, states he made those statements out of frustration.    DEC consult placed

## 2022-12-10 NOTE — ED NOTES
Report given to team 2 RN.  Sheila Barlow RN,.......................................... 12/10/2022   3:00 AM

## 2022-12-10 NOTE — ED NOTES
While pt was in CT, he told the CT staff that he was feeling suicidal and had a plan to end his life when he leaves the ED tonight. Pt currently is intoxicated. Pt stated that he has been making guns at home and he recently lost a friend to suicide. Pt also states that his girlfriend who is in the ED room with him, has been cheating on him. Pt is very tearful, speech is tangential and hard to follow.    MD notified.    Sheila Barlow RN,.......................................... 12/10/2022   2:10 AM
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
## 2023-07-02 ENCOUNTER — HOSPITAL ENCOUNTER (EMERGENCY)
Facility: CLINIC | Age: 23
Discharge: HOME | End: 2023-07-02
Attending: FAMILY MEDICINE | Admitting: FAMILY MEDICINE
Payer: COMMERCIAL

## 2023-07-02 VITALS
HEART RATE: 92 BPM | TEMPERATURE: 98.1 F | BODY MASS INDEX: 34.22 KG/M2 | WEIGHT: 281 LBS | SYSTOLIC BLOOD PRESSURE: 130 MMHG | HEIGHT: 76 IN | OXYGEN SATURATION: 98 % | DIASTOLIC BLOOD PRESSURE: 80 MMHG

## 2023-07-02 DIAGNOSIS — L03.114 CELLULITIS OF LEFT UPPER EXTREMITY: ICD-10-CM

## 2023-07-02 PROCEDURE — 99284 EMERGENCY DEPT VISIT MOD MDM: CPT | Mod: 25 | Performed by: FAMILY MEDICINE

## 2023-07-02 PROCEDURE — 76882 US LMTD JT/FCL EVL NVASC XTR: CPT | Performed by: FAMILY MEDICINE

## 2023-07-02 PROCEDURE — 76882 US LMTD JT/FCL EVL NVASC XTR: CPT | Mod: 26 | Performed by: FAMILY MEDICINE

## 2023-07-02 RX ORDER — CEFADROXIL 500 MG/1
500 CAPSULE ORAL 2 TIMES DAILY
Qty: 20 CAPSULE | Refills: 0 | Status: SHIPPED | OUTPATIENT
Start: 2023-07-02 | End: 2023-07-12

## 2023-07-02 NOTE — DISCHARGE INSTRUCTIONS
ICD-10-CM    1. Cellulitis of left upper extremity  L03.114 Primary Care Referral    take duricef twice daily for 10 days. follow-up 2-3 days for recheck. returnhere renaldo worsening, fever, rapid spread.  do not expect improvement for 2 days

## 2023-07-02 NOTE — ED PROVIDER NOTES
"  History     Chief Complaint   Patient presents with     Insect Bite     HPI  Sla Ye is a 23 year old male who presents with insect bite yesterday multiple to be no obvious tick bite presents today with rapidly progressive erythema tenderness warmth without fever in the upper left arm prior abscess on the opposite arm.  No involvement of the axilla.  Sometimes having paresthesias in the hand but no pallor or coolness or other associated changes no lower arm involvement.  No underlying diabetes heart disease lung disease or other systemic symptoms.  This is progressed rapidly from the last day.      Allergies:  Allergies   Allergen Reactions     No Known Drug Allergy        Problem List:    Patient Active Problem List    Diagnosis Date Noted     Right arm cellulitis 02/23/2022     Priority: Medium        Past Medical History:    No past medical history on file.    Past Surgical History:    Past Surgical History:   Procedure Laterality Date     ORTHOPEDIC SURGERY       TONSILLECTOMY Bilateral 12/17/2015    Procedure: TONSILLECTOMY;  Surgeon: Estefania Mendoza MD;  Location:  OR       Family History:    No family history on file.    Social History:  Marital Status:  Single [1]  Social History     Tobacco Use     Smoking status: Every Day     Types: Vaping Device     Smokeless tobacco: Never   Substance Use Topics     Alcohol use: Yes     Drug use: No        Medications:    cefadroxil (DURICEF) 500 MG capsule          Review of Systems   ROS:  5 point ROS negative except as noted above in HPI, including Gen., Resp., CV, GI &  system review.      Physical Exam   BP: 130/80  Pulse: 92  Temp: 98.1  F (36.7  C)  Height: 193 cm (6' 4\")  Weight: 127.5 kg (281 lb)  SpO2: 98 %      Physical Exam       There is a 12 x 18 cm oblong erythema that is tender warm swollen on the left medial aspect of the arm.  This does not extend into the antecubital space and does not extend into the axilla.  There is no obvious " significant adenopathy.  Patient is nontoxic in appearance.  Normal pulses radial.  Normal ulnar median radial nerve function motor and sensory.  No ascending lymphangitis.  There is central puncta or embedded insect.          I did perform a quick bedside ultrasound to help guide any aspiration that needed to take place for incision and drainage.  Did not capture those images.  However I saw throughout the region extensive cobblestoning consistent with cellulitis.  There is no abscess    ED Course                 Procedures              Critical Care time:  none               No results found for this or any previous visit (from the past 24 hour(s)).    Medications - No data to display    Assessments & Plan (with Medical Decision Making)     mdm: Sal Ye is a 23 year old male presents with insect bite yesterday and now rapidly progressive cellulitis on the medial aspect of the arm.  Bedside ultrasound is consistent with cellulitis but no obvious abscess.  This is tender to touch and erythematous swollen and findings are significant for cellulitis that is rapidly progressive.  He however is nontoxic in appearance without underlying comorbidity.  He has no fever.  His vital signs are stable.  I have initiated Duricef 500 mg twice daily for at least 7 days have given him a 10-day course.  We discussed that he will need recheck at 48 to 72 hours and not to expect improvement before that time but not to expect significant worsening after initiating antibiotics.  He is to collin the margins which I have marked also.  He should return here for any systemic symptoms or distal extremity neurovascular symptoms.  Rapid progression should result in recheck.    I have reviewed the nursing notes.    I have reviewed the findings, diagnosis, plan and need for follow up with the patient.           Medical Decision Making  The patient's presentation was of moderate complexity (an acute complicated injury).    The patient's  evaluation involved:  history and exam without other MDM data elements    The patient's management necessitated moderate risk (prescription drug management including medications given in the ED).        New Prescriptions    CEFADROXIL (DURICEF) 500 MG CAPSULE    Take 1 capsule (500 mg) by mouth 2 times daily for 10 days       Final diagnoses:   Cellulitis of left upper extremity - take duricef twice daily for 10 days. follow-up 2-3 days for recheck. returnhere renaldo worsening, fever, rapid spread.  do not expect improvement for 2 days       7/2/2023   [facility] EMERGENCY DEPT     Ren Hall MD  07/02/23 [id number]